# Patient Record
Sex: MALE | Race: ASIAN
[De-identification: names, ages, dates, MRNs, and addresses within clinical notes are randomized per-mention and may not be internally consistent; named-entity substitution may affect disease eponyms.]

---

## 2018-05-21 ENCOUNTER — HOSPITAL ENCOUNTER (EMERGENCY)
Dept: HOSPITAL 74 - JERFT | Age: 38
Discharge: HOME | End: 2018-05-21
Payer: COMMERCIAL

## 2018-05-21 VITALS — DIASTOLIC BLOOD PRESSURE: 68 MMHG | HEART RATE: 65 BPM | TEMPERATURE: 98 F | SYSTOLIC BLOOD PRESSURE: 141 MMHG

## 2018-05-21 VITALS — BODY MASS INDEX: 39.9 KG/M2
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
## 2018-05-21 DIAGNOSIS — S39.82XA: Primary | ICD-10-CM
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
## 2020-10-11 ENCOUNTER — HOSPITAL ENCOUNTER (EMERGENCY)
Dept: HOSPITAL 74 - JVIRT | Age: 40
Discharge: HOME | End: 2020-10-11
Payer: COMMERCIAL

## 2020-10-11 DIAGNOSIS — Z11.59: Primary | ICD-10-CM

## 2020-10-11 PROCEDURE — C9803 HOPD COVID-19 SPEC COLLECT: HCPCS

## 2020-10-11 PROCEDURE — U0003 INFECTIOUS AGENT DETECTION BY NUCLEIC ACID (DNA OR RNA); SEVERE ACUTE RESPIRATORY SYNDROME CORONAVIRUS 2 (SARS-COV-2) (CORONAVIRUS DISEASE [COVID-19]), AMPLIFIED PROBE TECHNIQUE, MAKING USE OF HIGH THROUGHPUT TECHNOLOGIES AS DESCRIBED BY CMS-2020-01-R: HCPCS

## 2020-10-11 NOTE — XMS
Summarization Of Episode

                           Created on:2020



Patient:ANNA ADLER

Sex:Male

:1980

External Reference #:[id number]





Demographics







                          Address                   [address] [place] [address]

 

                          Home Phone                [phone]

 

                          Work Phone                [phone]

 

                          Preferred Language        Unknown

 

                          Marital Status            Unknown

 

                          Shinto Affiliation     CH

 

                          Race                      AS

 

                          Ethnic Group              Unknown









Author







                          Organization              [facility]









Support







                Name            Relationship    Address         Phone

 

                MANJINDER CHANCE AND DIANNE Unavailable     [phone]





                                                [place] [address] 

 

                VITOR, MINI    WIFE            [address] [phone]

[phone]



                                                [place] [address] 









Re-disclosure Warning

The records that you are about to access may contain information from federally-
assisted alcohol or drug abuse programs. If such information is present, then 
the following federally mandated warning applies: This information has been 
disclosed to you from records protected by federal confidentiality rules (42 CFR
part 2). The federal rules prohibit you from making any further disclosure of 
this information unless further disclosure is expressly permitted by the written
consent of the person to whom it pertains or as otherwise permitted by 42 CFR 
part 2. A general authorization for the release of medical or other information 
is NOT sufficient for this purpose. The Federal rules restrict any use of the 
information to criminally investigate or prosecute any alcohol or drug abuse 
patient.The records that you are about to access may contain highly sensitive 
health information, the redisclosure of which is protected by Article 27-F of 
the [facility] Public Health law. If you continue you may haveaccess to 
information: Regarding HIV / AIDS; Provided by facilities licensed or operated 
by the [facility] Office of Mental Health; or Provided by the [facility]
Office for People With Developmental Disabilities. If such information is 
present, then the following [place] mandated warning applies: This 
information has been disclosed to you from confidential records which are 
protected by state law. State law prohibits you from making any further 
disclosure of this information without the specific written consent of the 
person to whom it pertains, or as otherwise permitted by law. Any unauthorized 
further disclosure in violation of state law may result in a fine or [facility] 
sentence or both. A general authorization for the release of medical or other 
information is NOT sufficient authorization for further disclosure.



Insurance Providers







          Payer name Policy type / Policy ID Covered   Covered party's Policy   

 Plan



                    Coverage type           party ID  relationship to [employer]    

Information



                                                  gunter              

 

          BC OUT OF           [id number]           S                   [id number]

[id number]



          [facility]               06                                      







Results







                    ID                  Date                Data Source

 

                    [id number]         2020 12:50:00 PM EDT [facility]











          Name      Value     Range     Interpretation Description Data      Sup

porting



                                        Code                Source(s) Document(s

)

 

          SARS                                              [facility]   



          coronavirus 2                                                   



          RNA                                                         









                                        This lab was ordered by [facility] and reported by [facility].











                                        Procedure

## 2020-10-11 NOTE — TELE
HPI


Do you have fever,cough or shortness of breath?: Yes





- General


History Source: Patient


Exam Limitations: No Limitations





- History of Present Illness


Timing/Duration: unsure


Associated Symptoms: reports: denies symptoms (40-year-old male with no past 

medical history except Dyslipidemia presents to [facility] for COVID 

testing.)





Past History





- Travel History


Traveled outside of the country in the last 30 days: No


Close contact w/someone who was outside of country & ill: No





- Medical History


Allergies/Adverse Reactions: 


                                    Allergies











Allergy/AdvReac Type Severity Reaction Status Date / Time


 


No Known Allergies Allergy   Verified 05/21/18 09:27











Home Medications: 


Ambulatory Orders





NK [No Known Home Medication]  05/21/18 








COPD: No


Hypercholesterolemia: Yes





- Immunization History


Td Vaccination: Yes





- Psycho-Social/Smoking History


Patient Lives Alone: No


Lives with/in: spouse/SO


Smoking Status: No


Smoking History: Never smoked


Number of Cigarettes Smoked Daily: 0





**Review of Systems





- Review of Systems


Able to Perform ROS?: Yes


Limited English proficient: No


Constitutional: No: Symptoms Reported


HEENTM: No: Symptoms Reported


Respiratory: No: Symptoms reported


Cardiac (ROS): No: Symptoms Reported


ABD/GI: No: Symptoms Reported


: No: Symptoms Reported


Musculoskeletal: No: Symptoms Reported


Integumentary: No: Symptoms Reported


Neurological: No: Symptoms reported


Endocrine: No: Symptoms Reported


Hematologic/Lymphatic: No: Symptoms Reported





*Physical Exam





- Physical Exam


General Appearance: Yes: Appropriately Dressed.  No: Apparent Distress


HEENT: positive: Normal Voice


Respiratory/Chest: negative: Respiratory Distress


Extremity: positive: Normal Inspection


Integumentary: positive: Normal Color


Neurologic: positive: Motor Strength 5/5 (Ambulatory)





- Medical Decision Making





10/11/20 13:12


Chief complaint: Patient here for COVID testing.


Patient asymptomatic.


Exam: Limited but otherwise normal.


Plan: Covid test ordered.








Discharge


Diagnosis at time of Disposition: 


 Encounter for laboratory testing for COVID-19 virus








- Referrals


Follow-up Referral(s): 


Mandy Samuel MD [Primary Care Provider] - 





- Patient Instructions





- Discharge


Disposition: HOME


Condition at time of Disposition: Good